# Patient Record
Sex: MALE | Race: WHITE | NOT HISPANIC OR LATINO | URBAN - NONMETROPOLITAN AREA
[De-identification: names, ages, dates, MRNs, and addresses within clinical notes are randomized per-mention and may not be internally consistent; named-entity substitution may affect disease eponyms.]

---

## 2024-04-19 ENCOUNTER — APPOINTMENT (OUTPATIENT)
Dept: URBAN - NONMETROPOLITAN AREA CLINIC 45 | Age: 29
Setting detail: DERMATOLOGY
End: 2024-04-21

## 2024-04-19 DIAGNOSIS — L91.8 OTHER HYPERTROPHIC DISORDERS OF THE SKIN: ICD-10-CM

## 2024-04-19 DIAGNOSIS — D22 MELANOCYTIC NEVI: ICD-10-CM

## 2024-04-19 DIAGNOSIS — L738 OTHER SPECIFIED DISEASES OF HAIR AND HAIR FOLLICLES: ICD-10-CM

## 2024-04-19 DIAGNOSIS — D18.0 HEMANGIOMA: ICD-10-CM

## 2024-04-19 DIAGNOSIS — L663 OTHER SPECIFIED DISEASES OF HAIR AND HAIR FOLLICLES: ICD-10-CM

## 2024-04-19 DIAGNOSIS — L57.8 OTHER SKIN CHANGES DUE TO CHRONIC EXPOSURE TO NONIONIZING RADIATION: ICD-10-CM

## 2024-04-19 PROBLEM — L02.222 FURUNCLE OF BACK [ANY PART, EXCEPT BUTTOCK]: Status: ACTIVE | Noted: 2024-04-19

## 2024-04-19 PROBLEM — D18.01 HEMANGIOMA OF SKIN AND SUBCUTANEOUS TISSUE: Status: ACTIVE | Noted: 2024-04-19

## 2024-04-19 PROBLEM — D22.61 MELANOCYTIC NEVI OF RIGHT UPPER LIMB, INCLUDING SHOULDER: Status: ACTIVE | Noted: 2024-04-19

## 2024-04-19 PROBLEM — D22.5 MELANOCYTIC NEVI OF TRUNK: Status: ACTIVE | Noted: 2024-04-19

## 2024-04-19 PROCEDURE — OTHER SUNSCREEN RECOMMENDATIONS: OTHER

## 2024-04-19 PROCEDURE — OTHER OBSERVATION: OTHER

## 2024-04-19 PROCEDURE — OTHER COUNSELING: OTHER

## 2024-04-19 PROCEDURE — OTHER MONITORING: OTHER

## 2024-04-19 PROCEDURE — OTHER OBSERVATION AND MEASURE: OTHER

## 2024-04-19 PROCEDURE — 99203 OFFICE O/P NEW LOW 30 MIN: CPT

## 2024-04-19 PROCEDURE — OTHER TREATMENT REGIMEN: OTHER

## 2024-04-19 PROCEDURE — OTHER MIPS QUALITY: OTHER

## 2024-04-19 PROCEDURE — OTHER PHOTO-DOCUMENTATION: OTHER

## 2024-04-19 ASSESSMENT — LOCATION DETAILED DESCRIPTION DERM
LOCATION DETAILED: EPIGASTRIC SKIN
LOCATION DETAILED: RIGHT INFERIOR MEDIAL MIDBACK
LOCATION DETAILED: SUPERIOR THORACIC SPINE
LOCATION DETAILED: LEFT INFERIOR LATERAL MIDBACK
LOCATION DETAILED: RIGHT POSTERIOR SHOULDER
LOCATION DETAILED: LEFT MID-UPPER BACK
LOCATION DETAILED: INFERIOR THORACIC SPINE
LOCATION DETAILED: LEFT CLAVICULAR NECK
LOCATION DETAILED: LEFT SUPERIOR UPPER BACK

## 2024-04-19 ASSESSMENT — LOCATION ZONE DERM
LOCATION ZONE: ARM
LOCATION ZONE: NECK
LOCATION ZONE: TRUNK

## 2024-04-19 ASSESSMENT — LOCATION SIMPLE DESCRIPTION DERM
LOCATION SIMPLE: RIGHT SHOULDER
LOCATION SIMPLE: LEFT UPPER BACK
LOCATION SIMPLE: UPPER BACK
LOCATION SIMPLE: ABDOMEN
LOCATION SIMPLE: LEFT ANTERIOR NECK
LOCATION SIMPLE: RIGHT LOWER BACK
LOCATION SIMPLE: LEFT LOWER BACK

## 2024-04-19 NOTE — PROCEDURE: SUNSCREEN RECOMMENDATIONS

## 2024-07-23 ENCOUNTER — APPOINTMENT (OUTPATIENT)
Dept: URBAN - NONMETROPOLITAN AREA CLINIC 45 | Age: 29
Setting detail: DERMATOLOGY
End: 2024-07-27

## 2024-07-23 DIAGNOSIS — L738 OTHER SPECIFIED DISEASES OF HAIR AND HAIR FOLLICLES: ICD-10-CM

## 2024-07-23 DIAGNOSIS — L30.8 OTHER SPECIFIED DERMATITIS: ICD-10-CM

## 2024-07-23 DIAGNOSIS — L663 OTHER SPECIFIED DISEASES OF HAIR AND HAIR FOLLICLES: ICD-10-CM

## 2024-07-23 DIAGNOSIS — D22 MELANOCYTIC NEVI: ICD-10-CM

## 2024-07-23 PROBLEM — D22.5 MELANOCYTIC NEVI OF TRUNK: Status: ACTIVE | Noted: 2024-07-23

## 2024-07-23 PROBLEM — L02.222 FURUNCLE OF BACK [ANY PART, EXCEPT BUTTOCK]: Status: ACTIVE | Noted: 2024-07-23

## 2024-07-23 PROBLEM — D22.61 MELANOCYTIC NEVI OF RIGHT UPPER LIMB, INCLUDING SHOULDER: Status: ACTIVE | Noted: 2024-07-23

## 2024-07-23 PROCEDURE — OTHER TREATMENT REGIMEN: OTHER

## 2024-07-23 PROCEDURE — OTHER OBSERVATION: OTHER

## 2024-07-23 PROCEDURE — OTHER PRESCRIPTION: OTHER

## 2024-07-23 PROCEDURE — OTHER OBSERVATION AND MEASURE: OTHER

## 2024-07-23 PROCEDURE — OTHER PRESCRIPTION MEDICATION MANAGEMENT: OTHER

## 2024-07-23 PROCEDURE — OTHER COUNSELING: OTHER

## 2024-07-23 PROCEDURE — OTHER MIPS QUALITY: OTHER

## 2024-07-23 PROCEDURE — 99214 OFFICE O/P EST MOD 30 MIN: CPT

## 2024-07-23 RX ORDER — TRIAMCINOLONE ACETONIDE 1 MG/G
APPLY CREAM TOPICAL
Qty: 80 | Refills: 3 | Status: ERX | COMMUNITY
Start: 2024-07-23

## 2024-07-23 ASSESSMENT — LOCATION DETAILED DESCRIPTION DERM
LOCATION DETAILED: LEFT MID-UPPER BACK
LOCATION DETAILED: RIGHT POSTERIOR SHOULDER
LOCATION DETAILED: LEFT DISTAL ULNAR THUMB
LOCATION DETAILED: INFERIOR THORACIC SPINE
LOCATION DETAILED: LEFT SUPERIOR UPPER BACK
LOCATION DETAILED: LEFT INFERIOR LATERAL MIDBACK
LOCATION DETAILED: RIGHT INFERIOR MEDIAL MIDBACK
LOCATION DETAILED: PERIUNGUAL SKIN RIGHT THUMB

## 2024-07-23 ASSESSMENT — LOCATION SIMPLE DESCRIPTION DERM
LOCATION SIMPLE: LEFT THUMB
LOCATION SIMPLE: RIGHT SHOULDER
LOCATION SIMPLE: LEFT UPPER BACK
LOCATION SIMPLE: UPPER BACK
LOCATION SIMPLE: LEFT LOWER BACK
LOCATION SIMPLE: RIGHT THUMB
LOCATION SIMPLE: RIGHT LOWER BACK

## 2024-07-23 ASSESSMENT — LOCATION ZONE DERM
LOCATION ZONE: TRUNK
LOCATION ZONE: ARM
LOCATION ZONE: FINGER

## 2024-07-23 ASSESSMENT — SEVERITY ASSESSMENT: SEVERITY: MILD TO MODERATE

## 2024-07-23 ASSESSMENT — PAIN INTENSITY VAS: HOW INTENSE IS YOUR PAIN 0 BEING NO PAIN, 10 BEING THE MOST SEVERE PAIN POSSIBLE?: NO PAIN

## 2024-07-23 ASSESSMENT — ITCH NUMERIC RATING SCALE: HOW SEVERE IS YOUR ITCHING?: 0

## 2024-07-23 ASSESSMENT — BSA RASH: BSA RASH: 5

## 2024-07-23 NOTE — PROCEDURE: OBSERVATION
Detail Level: Detailed
Size Of Lesion: 1 cm
Size Of Lesion: 8 mm
Size Of Lesion: 8mm
Size Of Lesion: 5 mm

## 2024-07-23 NOTE — PROCEDURE: PRESCRIPTION MEDICATION MANAGEMENT
Plan: Start triamcinolone acetonide 0.1 % topical cream \\nApply to hands 1-2 times a day as needed for irritation\\n\\nAvoid manipulating/picking at cuticles
Detail Level: Detailed
Render In Strict Bullet Format?: No